# Patient Record
Sex: FEMALE | Race: WHITE
[De-identification: names, ages, dates, MRNs, and addresses within clinical notes are randomized per-mention and may not be internally consistent; named-entity substitution may affect disease eponyms.]

---

## 2019-06-01 ENCOUNTER — HOSPITAL ENCOUNTER (EMERGENCY)
Dept: HOSPITAL 91 - E/R | Age: 44
Discharge: HOME | End: 2019-06-01
Payer: MEDICAID

## 2019-06-01 ENCOUNTER — HOSPITAL ENCOUNTER (EMERGENCY)
Dept: HOSPITAL 10 - E/R | Age: 44
Discharge: HOME | End: 2019-06-01
Payer: MEDICAID

## 2019-06-01 VITALS — HEART RATE: 81 BPM | SYSTOLIC BLOOD PRESSURE: 154 MMHG | DIASTOLIC BLOOD PRESSURE: 76 MMHG | RESPIRATION RATE: 18 BRPM

## 2019-06-01 VITALS — HEIGHT: 55 IN | BODY MASS INDEX: 67.81 KG/M2 | WEIGHT: 293 LBS

## 2019-06-01 DIAGNOSIS — N83.201: Primary | ICD-10-CM

## 2019-06-01 LAB
ABNORMAL IP MESSAGE: 1
ADD MAN DIFF?: NO
ADD UMIC: YES
ALANINE AMINOTRANSFERASE: 22 IU/L (ref 13–69)
ALBUMIN/GLOBULIN RATIO: 1.18
ALBUMIN: 4.4 G/DL (ref 3.3–4.9)
ALKALINE PHOSPHATASE: 98 IU/L (ref 42–121)
ANION GAP: 9 (ref 5–13)
ASPARTATE AMINO TRANSFERASE: 39 IU/L (ref 15–46)
BASOPHIL #: 0.1 10^3/UL (ref 0–0.1)
BASOPHILS %: 0.7 % (ref 0–2)
BILIRUBIN,DIRECT: 0 MG/DL (ref 0–0.2)
BILIRUBIN,TOTAL: 0.3 MG/DL (ref 0.2–1.3)
BLOOD UREA NITROGEN: 14 MG/DL (ref 7–20)
CALCIUM: 9.1 MG/DL (ref 8.4–10.2)
CARBON DIOXIDE: 26 MMOL/L (ref 21–31)
CHLORIDE: 107 MMOL/L (ref 97–110)
CREATININE: 0.56 MG/DL (ref 0.44–1)
EOSINOPHILS #: 0.3 10^3/UL (ref 0–0.5)
EOSINOPHILS %: 2.2 % (ref 0–7)
GLOBULIN: 3.7 G/DL (ref 1.3–3.2)
GLUCOSE: 111 MG/DL (ref 70–220)
HEMATOCRIT: 41.8 % (ref 37–47)
HEMOGLOBIN: 13.7 G/DL (ref 12–16)
IMMATURE GRANS #M: 0.09 10^3/UL (ref 0–0.03)
IMMATURE GRANS % (M): 0.6 % (ref 0–0.43)
LYMPHOCYTES #: 5.2 10^3/UL (ref 0.8–2.9)
LYMPHOCYTES %: 37.1 % (ref 15–51)
MEAN CORPUSCULAR HEMOGLOBIN: 29.7 PG (ref 29–33)
MEAN CORPUSCULAR HGB CONC: 32.8 G/DL (ref 32–37)
MEAN CORPUSCULAR VOLUME: 90.5 FL (ref 82–101)
MEAN PLATELET VOLUME: 10.3 FL (ref 7.4–10.4)
MONOCYTE #: 0.9 10^3/UL (ref 0.3–0.9)
MONOCYTES %: 6.8 % (ref 0–11)
NEUTROPHIL #: 7.3 10^3/UL (ref 1.6–7.5)
NEUTROPHILS %: 52.6 % (ref 39–77)
NUCLEATED RED BLOOD CELLS #: 0 10^3/UL (ref 0–0)
NUCLEATED RED BLOOD CELLS%: 0 /100WBC (ref 0–0)
PLATELET COUNT: 327 10^3/UL (ref 140–415)
POSITIVE DIFF: (no result)
POTASSIUM: 4.1 MMOL/L (ref 3.5–5.1)
RED BLOOD COUNT: 4.62 10^6/UL (ref 4.2–5.4)
RED CELL DISTRIBUTION WIDTH: 14.1 % (ref 11.5–14.5)
SODIUM: 142 MMOL/L (ref 135–144)
TOTAL PROTEIN: 8.1 G/DL (ref 6.1–8.1)
UR ASCORBIC ACID: NEGATIVE MG/DL
UR BACTERIA: (no result) /HPF
UR BILIRUBIN (DIP): NEGATIVE MG/DL
UR BLOOD (DIP): (no result) MG/DL
UR CLARITY: (no result)
UR COLOR: YELLOW
UR GLUCOSE (DIP): NEGATIVE MG/DL
UR KETONES (DIP): NEGATIVE MG/DL
UR LEUKOCYTE ESTERASE (DIP): NEGATIVE LEU/UL
UR NITRITE (DIP): NEGATIVE MG/DL
UR PH (DIP): 6 (ref 5–9)
UR RBC: 9 /HPF (ref 0–5)
UR SPECIFIC GRAVITY (DIP): 1.02 (ref 1–1.03)
UR SQUAMOUS EPITHELIAL CELL: (no result) /HPF
UR TOTAL PROTEIN (DIP): NEGATIVE MG/DL
UR UROBILINOGEN (DIP): NEGATIVE MG/DL
UR WBC: 1 /HPF (ref 0–5)
WHITE BLOOD COUNT: 13.9 10^3/UL (ref 4.8–10.8)

## 2019-06-01 PROCEDURE — 81001 URINALYSIS AUTO W/SCOPE: CPT

## 2019-06-01 PROCEDURE — 93005 ELECTROCARDIOGRAM TRACING: CPT

## 2019-06-01 PROCEDURE — 74176 CT ABD & PELVIS W/O CONTRAST: CPT

## 2019-06-01 PROCEDURE — 84702 CHORIONIC GONADOTROPIN TEST: CPT

## 2019-06-01 PROCEDURE — 85025 COMPLETE CBC W/AUTO DIFF WBC: CPT

## 2019-06-01 PROCEDURE — 36415 COLL VENOUS BLD VENIPUNCTURE: CPT

## 2019-06-01 PROCEDURE — 99285 EMERGENCY DEPT VISIT HI MDM: CPT

## 2019-06-01 PROCEDURE — 84703 CHORIONIC GONADOTROPIN ASSAY: CPT

## 2019-06-01 PROCEDURE — 80053 COMPREHEN METABOLIC PANEL: CPT

## 2019-06-01 PROCEDURE — 81025 URINE PREGNANCY TEST: CPT

## 2019-06-01 NOTE — ERD
ER Documentation


Chief Complaint


Chief Complaint





AP X 1 WEEK, VAGIANL BLEEDING INTERMITTENT X MOS





HPI


This is a 43-year-old female presents for abdominal pain intermittent for the 


last week.  Pain is mainly localized to the right upper quadrant, she states 


that she has had vaginal bleeding intermittently for the last several months, 


since December which is the last time she had a regular period.  Currently she 


denies any pelvic pain, she has not had any vaginal discharge.  She denies 


nausea or vomiting.  She has not had any chest pain or





ROS


All systems reviewed and are negative except as per history of present illness.





Medications


Home Meds


Active Scripts


Famotidine* (Pepcid*) 20 Mg Tablet, 20 MG PO BID for 10 Days, TAB


   Prov:ARANZA BAKER MD         6/1/19





Allergies


Allergies:  


Coded Allergies:  


     No Known Allergy (Verified , 7/16/11)





PMhx/Soc


History of Surgery:  Yes (gallstone)


Anesthesia Reaction:  No


Hx Neurological Disorder:  No


Hx Respiratory Disorders:  No


Hx Cardiac Disorders:  No


Hx Psychiatric Problems:  No


Hx Miscellaneous Medical Probl:  Yes (unknown throid prob)


Hx Alcohol Use:  No


Hx Substance Use:  No


Hx Tobacco Use:  No





Physical Exam


Vitals





Vital Signs


  Date      Temp  Pulse  Resp  B/P (MAP)   Pulse Ox  O2          O2 Flow    FiO2


Time                                                 Delivery    Rate


    6/1/19  99.9     84    18      166/84        99


     17:57                          (111)





Physical Exam


Const:   No acute distress


Head:   Atraumatic 


Eyes:    Normal Conjunctiva


ENT:    Normal External Ears, Nose and Mouth.


Neck:               Full range of motion. No meningismus.


Resp:   Clear to auscultation bilaterally


Cardio:   Regular rate and rhythm, no murmurs


Abd:    Soft, mild tenderness to the right upper quadrant, no rebound or 


guarding, non distended, no McBurney's point tenderness. Normal bowel sounds


Skin:   No petechiae or rashes


Back:   No midline or flank tenderness


Ext:    No cyanosis, or edema


Neur:   Awake and alert


Psych:    Normal Mood and Affect


Result Diagram:  


6/1/19 2020 6/1/19 2020





Results 24 hrs





Laboratory Tests


Test
                                 6/1/19
20:18    6/1/19
20:20  6/1/19
20:26


Urine Color                        YELLOW


Urine Clarity
                     SLIGHTLY
CLOUDY  
               



Urine pH                                      6.0


Urine Specific Gravity                      1.019


Urine Ketones                      NEGATIVE mg/dL


Urine Nitrite                      NEGATIVE mg/dL


Urine Bilirubin                    NEGATIVE mg/dL


Urine Urobilinogen                 NEGATIVE mg/dL


Urine Leukocyte Esterase
          NEGATIVE
Sammy/ul  
               



Urine Microscopic RBC                      9 /HPF


Urine Microscopic WBC                      1 /HPF


Urine Squamous Epithelial
Cells    FEW /HPF 
       
               



Urine Bacteria                     FEW /HPF


Urine Hemoglobin                         2+ mg/dL


Urine Glucose                      NEGATIVE mg/dL


Urine Total Protein                NEGATIVE mg/dl


White Blood Count                                    13.9 10^3/ul


Red Blood Count                                      4.62 10^6/ul


Hemoglobin                                              13.7 g/dl


Hematocrit                                                 41.8 %


Mean Corpuscular Volume                                   90.5 fl


Mean Corpuscular Hemoglobin                               29.7 pg


Mean Corpuscular                   
                   32.8 g/dl 
  



Hemoglobin
Concent


Red Cell Distribution Width                                14.1 %


Platelet Count                                        327 10^3/UL


Mean Platelet Volume                                      10.3 fl


Immature Granulocytes %                                   0.600 %


Neutrophils %                                              52.6 %


Lymphocytes %                                              37.1 %


Monocytes %                                                 6.8 %


Eosinophils %                                               2.2 %


Basophils %                                                 0.7 %


Nucleated Red Blood Cells %                           0.0 /100WBC


Immature Granulocytes #                             0.090 10^3/ul


Neutrophils #                                         7.3 10^3/ul


Lymphocytes #                                         5.2 10^3/ul


Monocytes #                                           0.9 10^3/ul


Eosinophils #                                         0.3 10^3/ul


Basophils #                                           0.1 10^3/ul


Nucleated Red Blood Cells #                           0.0 10^3/ul


Sodium Level                                           142 mmol/L


Potassium Level                                        4.1 mmol/L


Chloride Level                                         107 mmol/L


Carbon Dioxide Level                                    26 mmol/L


Anion Gap                                                       9


Blood Urea Nitrogen                                      14 mg/dl


Creatinine                                             0.56 mg/dl


Est Glomerular Filtrat             
                > 60 mL/min 
   



Rate
mL/min


Glucose Level                                           111 mg/dl


Calcium Level                                           9.1 mg/dl


Total Bilirubin                                         0.3 mg/dl


Direct Bilirubin                                       0.00 mg/dl


Indirect Bilirubin                                      0.3 mg/dl


Aspartate Amino Transf
(AST/SGOT)  
                     39 IU/L 
  



Alanine                            
                     22 IU/L 
  



Aminotransferase
(ALT/SGPT)


Alkaline Phosphatase                                      98 IU/L


Total Protein                                            8.1 g/dl


Albumin                                                  4.4 g/dl


Globulin                                                3.70 g/dl


Albumin/Globulin Ratio                                       1.18


POC Beta HCG, Qualitative                                           NEGATIVE








Procedures/MDM


This is a 43-year-old female presents for evaluation of abdominal pain.. On exam


the pain since pain was localized to her epigastric area, she had no pelvic 


pain, and no infectious symptoms, I have a very low suspicion for pelvic 


inflammatory disease.  Her CT abdomen pelvis showed no acute intra-abdominal 


findings, it did show a 10 cm cyst, and her right adnexal area.  The patient 


denied any pain to this area, I offered ultrasound performed in the ED, however 


she declined, as she did not have any pain, and preferred to follow-up with her 


gynecologist for this, strict return precautions were given for any pelvic pain,


any fever or any worsening symptoms at discharge patient was in no distress.











EKG: 


Rate/Rhythm:       Normal Sinus Rhythm


QRS, ST, T-waves:    No changes consistent w/ acute ischemia


Impression:      No evidence of ischemia or arrhythmia





Departure


Diagnosis:  


   Primary Impression:  


   Abdominal pain


   Abdominal location:  unspecified location  Qualified Codes:  R10.9 - 


   Unspecified abdominal pain


   Additional Impression:  


   Ovarian cyst


   Laterality:  right  Qualified Codes:  N83.201 - Unspecified ovarian cyst, 


   right side


Condition:  Stable











ARANZA BAKER MD                Jun 1, 2019 20:15